# Patient Record
(demographics unavailable — no encounter records)

---

## 2025-03-14 NOTE — PHYSICAL EXAM
[de-identified] : PHYSICAL EXAM LEFT KNEE  TENDER PES BURSA  AROM EXTENSION = 0 FLEXION = 130   SPECIAL TESTS  PATELLAR GRIND = NEG DRAWER  = NEG LACHMAN = NEG MACMURRAY = NEG   MOTOR = GROSSLY INTACT SENSORY = GROSSLY INTACT    [de-identified] : I ORDERED XRAYS OF KNEE TO EVALUATE PAINFUL SYMPTOMS  XRAY LEFT KNEE: 4 views of the knee 7.6 DEGREES TILT No obvious fracture or dislocation. Alignment within normal limits

## 2025-03-14 NOTE — DISCUSSION/SUMMARY
[de-identified] : COLD PACKS 3-4 TIMES DAY FOR 20 MINUTES  NO GYM GYM SPORTS 3 WEEKS  CONSDER KENALOG INJECTION

## 2025-03-14 NOTE — HISTORY OF PRESENT ILLNESS
[de-identified] : LEFT KNEE PAIN  STARTED YESTERDAY-NO SPECIFIC INJURY  PATIENT STATES SHE IS UNABLET TO WALK AND UNABLE TO BEND KNEE  PAIN LEVEL: 5/10  BETTER WITH REST  DUL PAIN  CONSTANT